# Patient Record
Sex: FEMALE | Race: WHITE | NOT HISPANIC OR LATINO | Employment: STUDENT | URBAN - METROPOLITAN AREA
[De-identification: names, ages, dates, MRNs, and addresses within clinical notes are randomized per-mention and may not be internally consistent; named-entity substitution may affect disease eponyms.]

---
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
## 2024-02-16 ENCOUNTER — PATIENT MESSAGE (OUTPATIENT)
Dept: INTERNAL MEDICINE | Facility: CLINIC | Age: 19
End: 2024-02-16
Payer: OTHER GOVERNMENT

## 2024-06-06 DIAGNOSIS — Z30.09 OTHER GENERAL COUNSELING AND ADVICE FOR CONTRACEPTIVE MANAGEMENT: ICD-10-CM

## 2024-06-06 DIAGNOSIS — N92.6 IRREGULAR MENSTRUAL CYCLE: ICD-10-CM

## 2024-06-06 RX ORDER — ETHYNODIOL DIACETATE AND ETHINYL ESTRADIOL 1 MG-35MCG
1 KIT ORAL DAILY
Qty: 90 TABLET | Refills: 3 | Status: SHIPPED | OUTPATIENT
Start: 2024-06-06 | End: 2025-06-06

## 2024-07-01 ENCOUNTER — OFFICE VISIT (OUTPATIENT)
Dept: URGENT CARE | Facility: CLINIC | Age: 19
End: 2024-07-01
Payer: OTHER GOVERNMENT

## 2024-07-01 VITALS
WEIGHT: 105.19 LBS | HEART RATE: 107 BPM | SYSTOLIC BLOOD PRESSURE: 113 MMHG | BODY MASS INDEX: 19.86 KG/M2 | HEIGHT: 61 IN | RESPIRATION RATE: 16 BRPM | TEMPERATURE: 99 F | DIASTOLIC BLOOD PRESSURE: 79 MMHG | OXYGEN SATURATION: 97 %

## 2024-07-01 DIAGNOSIS — S96.911A STRAIN OF RIGHT ANKLE, INITIAL ENCOUNTER: ICD-10-CM

## 2024-07-01 DIAGNOSIS — L60.0 INGROWN TOENAIL OF LEFT FOOT: ICD-10-CM

## 2024-07-01 DIAGNOSIS — L03.032 CELLULITIS OF GREAT TOE OF LEFT FOOT: Primary | ICD-10-CM

## 2024-07-01 PROCEDURE — 99213 OFFICE O/P EST LOW 20 MIN: CPT | Mod: S$GLB,,, | Performed by: NURSE PRACTITIONER

## 2024-07-01 RX ORDER — NITROFURANTOIN 25; 75 MG/1; MG/1
CAPSULE ORAL
COMMUNITY
Start: 2024-04-27

## 2024-07-01 RX ORDER — MUPIROCIN 20 MG/G
OINTMENT TOPICAL 3 TIMES DAILY
Qty: 22 G | Refills: 0 | Status: SHIPPED | OUTPATIENT
Start: 2024-07-01 | End: 2024-07-06

## 2024-07-01 RX ORDER — DOXYCYCLINE 100 MG/1
100 CAPSULE ORAL 2 TIMES DAILY
Qty: 20 CAPSULE | Refills: 0 | Status: SHIPPED | OUTPATIENT
Start: 2024-07-01 | End: 2024-07-11

## 2024-07-01 RX ORDER — CIPROFLOXACIN 500 MG/1
TABLET ORAL
COMMUNITY
Start: 2024-03-23

## 2024-07-01 NOTE — PROGRESS NOTES
"Subjective:      Patient ID: Kandy Urias is a 19 y.o. female.    Vitals:  height is 5' 1" (1.549 m) and weight is 47.7 kg (105 lb 2.6 oz). Her oral temperature is 98.5 °F (36.9 °C). Her blood pressure is 113/79 and her pulse is 107. Her respiration is 16 and oxygen saturation is 97%.     Chief Complaint: Nail Problem    Pt states she started having symptoms 2-3 weeks ago. Pt states she has a ingrown toe nail on her left great toe. Pt states she did salt water soaks and castor oil on the toe nail and it gave her mild relief.     Nail Problem  This is a new problem. The current episode started 1 to 4 weeks ago. The problem occurs constantly. The problem has been unchanged. Pertinent negatives include no abdominal pain, chest pain, chills, congestion, coughing, diaphoresis, fatigue, fever, headaches, myalgias, nausea, neck pain, rash, sore throat or vomiting. Nothing aggravates the symptoms. Treatments tried: salt water soaks and castor oil. The treatment provided mild relief.       Constitution: Negative for chills, sweating, fatigue and fever.   HENT:  Negative for ear pain, congestion and sore throat.    Neck: Negative for neck pain and neck stiffness.   Cardiovascular:  Negative for chest pain, leg swelling, palpitations and sob on exertion.   Eyes:  Negative for eye pain, eye redness and vision loss.   Respiratory:  Negative for cough, sputum production and shortness of breath.    Gastrointestinal:  Negative for abdominal pain, nausea, vomiting and diarrhea.   Genitourinary:  Negative for dysuria, frequency, urgency, flank pain and hematuria.   Musculoskeletal:  Negative for pain, trauma, abnormal ROM of joint and muscle ache.        Pt rolled ankle at wedding, pt also reports wearing close toed pointed shoes with worsen ingrown toenail symptoms.     Skin:  Negative for color change and rash.   Neurological:  Negative for dizziness, headaches and disorientation.   Psychiatric/Behavioral:  Negative for " disorientation.         Objective:     Physical Exam   Constitutional: She is oriented to person, place, and time. She appears well-developed. awake  HENT:   Head: Normocephalic and atraumatic.   Ears:   Right Ear: Hearing and external ear normal.   Left Ear: Hearing and external ear normal.   Nose: Nose normal.   Mouth/Throat: Uvula is midline, oropharynx is clear and moist and mucous membranes are normal.   Eyes: Conjunctivae, EOM and lids are normal. Pupils are equal, round, and reactive to light.   Neck: Trachea normal and phonation normal. Neck supple. No thyromegaly present.   Cardiovascular: Normal rate, regular rhythm, S1 normal, S2 normal, normal heart sounds and normal pulses.   Pulmonary/Chest: Effort normal and breath sounds normal. No respiratory distress. She has no decreased breath sounds. She has no wheezes. She has no rhonchi.   Abdominal: Bowel sounds are normal. Soft. flat abdomen   Musculoskeletal: Normal range of motion.         General: Normal range of motion.      Right ankle: She exhibits swelling. She exhibits normal range of motion, no ecchymosis, no deformity and no laceration. No tenderness. Achilles tendon normal.      Left ankle: Normal.      Right lower leg: No edema.      Left lower leg: No edema.      Left foot: Left great toe: Exhibits swelling and tenderness.      Comments: Right foot with mild swelling to lateral  ankle full range of motion, +2 pedal and posterior pulse   Lymphadenopathy:     She has no cervical adenopathy.   Neurological: She is alert and oriented to person, place, and time.   Skin: Skin is warm, dry and intact. not right ankle  Psychiatric: Her speech is normal and behavior is normal. Judgment and thought content normal.   Nursing note and vitals reviewed.      Assessment:     1. Cellulitis of great toe of left foot    2. Strain of right ankle, initial encounter    3. Ingrown toenail of left foot        Plan:       Cellulitis of great toe of left foot  -      mupirocin (BACTROBAN) 2 % ointment; Apply topically 3 (three) times daily. Apply to Right great toe for 5 days  Dispense: 22 g; Refill: 0  -     doxycycline (VIBRAMYCIN) 100 MG Cap; Take 1 capsule (100 mg total) by mouth 2 (two) times daily. for 10 days  Dispense: 20 capsule; Refill: 0  -     Ambulatory referral/consult to Podiatry    Strain of right ankle, initial encounter  -     Ambulatory referral/consult to Podiatry    Ingrown toenail of left foot  -     mupirocin (BACTROBAN) 2 % ointment; Apply topically 3 (three) times daily. Apply to Right great toe for 5 days  Dispense: 22 g; Refill: 0  -     doxycycline (VIBRAMYCIN) 100 MG Cap; Take 1 capsule (100 mg total) by mouth 2 (two) times daily. for 10 days  Dispense: 20 capsule; Refill: 0  -     Ambulatory referral/consult to Podiatry      Patient Instructions   Discharge instructions for Cellulitis to left great toe, ingrown toenail and right ankle strain  Referral to Podiatry for further evaluation of ingrown toenail with Cellulitis    Cleanse Left great toe with Hibiclens wash and follow up with normal saline wound wash, pat dry then apply Mupirocin ointment as prescribed 3 times a day for 5 days.  Elevate Left foot to decrease swelling to Left great toe.    Right Ankle Strain   A strain is an injury to muscles or tendons. Immediate treatment of sprains or strains includes RICE - Rest, ice, compression and elevation to the affected joint or limb as needed.    Rest. Allow your injury to heal before you do slow movements.  Place an ice pack or a bag of frozen peas wrapped in a towel over the painful part. Never put ice right on the skin. Do not leave the ice on more than 10 to 15 minutes at a time. Ice after activity may help decrease pain and swelling. Never ice before stretching.  Prop your wrist/arm/knee/pain on pillows to help with swelling.  Use a splint/ brace if the doctor tells you to do this.  Please drink plenty of fluids.  Please get plenty of  rest.      Pain Control  If not allergic, please take over the counter Tylenol (Acetaminophen) 500 mg (take 2 by mouth) every 6 hours and/or Motrin (Ibuprofen) 800 mg every 6 hours as directed for control of pain and/or fever.    If you were prescribed a narcotic medication, do not drive or operate heavy equipment or machinery while taking these medications.    If you were not prescribed an anti-inflammatory medication, and if you do not have any history of stomach/intestinal ulcers, or kidney disease, or are not taking a blood thinner such as Coumadin, Plavix, Pradaxa, Eloquis, or Xaralta for example, it is OK to take over the counter Ibuprofen or Advil or Motrin or Aleve as directed.  Do not take these medications on an empty stomach.    Please remember that you have received care at an urgent care today. Urgent cares are not emergency rooms and are not equipped to handle life threatening emergencies and cannot rule in or out certain medical conditions and you may be released before all of your medical problems are known or treated. Please arrange follow up with your primary care physician or speciality clinic   (orthopedics) within 2-5 days if your signs and symptoms have not resolved or worsen. Patient can call our Referral Hotline at [phone] to make an appointment.    Please return here or go to the Emergency Department for any concerns or worsening of condition.Patient was educated on signs/symptoms that would warrant emergent medical attention. Patient verbalized understanding.  More trouble getting up from a chair, going up and down stairs, or walking  Pain, swelling, warmth, numbness, tingling, or discoloration in the calf below the injured or sore knee  You are not feeling better in 2 or 3 days or you are feeling worse

## 2024-07-01 NOTE — PATIENT INSTRUCTIONS
Discharge instructions for Cellulitis to left great toe, ingrown toenail and right ankle strain  Referral to Podiatry for further evaluation of ingrown toenail with Cellulitis    Cleanse Left great toe with Hibiclens wash and follow up with normal saline wound wash, pat dry then apply Mupirocin ointment as prescribed 3 times a day for 5 days.  Elevate Left foot to decrease swelling to Left great toe.    Right Ankle Strain   A strain is an injury to muscles or tendons. Immediate treatment of sprains or strains includes RICE - Rest, ice, compression and elevation to the affected joint or limb as needed.    Rest. Allow your injury to heal before you do slow movements.  Place an ice pack or a bag of frozen peas wrapped in a towel over the painful part. Never put ice right on the skin. Do not leave the ice on more than 10 to 15 minutes at a time. Ice after activity may help decrease pain and swelling. Never ice before stretching.  Prop your wrist/arm/knee/pain on pillows to help with swelling.  Use a splint/ brace if the doctor tells you to do this.  Please drink plenty of fluids.  Please get plenty of rest.      Pain Control  If not allergic, please take over the counter Tylenol (Acetaminophen) 500 mg (take 2 by mouth) every 6 hours and/or Motrin (Ibuprofen) 800 mg every 6 hours as directed for control of pain and/or fever.    If you were prescribed a narcotic medication, do not drive or operate heavy equipment or machinery while taking these medications.    If you were not prescribed an anti-inflammatory medication, and if you do not have any history of stomach/intestinal ulcers, or kidney disease, or are not taking a blood thinner such as Coumadin, Plavix, Pradaxa, Eloquis, or Xaralta for example, it is OK to take over the counter Ibuprofen or Advil or Motrin or Aleve as directed.  Do not take these medications on an empty stomach.    Please remember that you have received care at an urgent care today. Urgent cares  are not emergency rooms and are not equipped to handle life threatening emergencies and cannot rule in or out certain medical conditions and you may be released before all of your medical problems are known or treated. Please arrange follow up with your primary care physician or speciality clinic   (orthopedics) within 2-5 days if your signs and symptoms have not resolved or worsen. Patient can call our Referral Hotline at [phone] to make an appointment.    Please return here or go to the Emergency Department for any concerns or worsening of condition.Patient was educated on signs/symptoms that would warrant emergent medical attention. Patient verbalized understanding.  More trouble getting up from a chair, going up and down stairs, or walking  Pain, swelling, warmth, numbness, tingling, or discoloration in the calf below the injured or sore knee  You are not feeling better in 2 or 3 days or you are feeling worse

## 2024-11-19 DIAGNOSIS — Z30.09 OTHER GENERAL COUNSELING AND ADVICE FOR CONTRACEPTIVE MANAGEMENT: ICD-10-CM

## 2024-11-19 DIAGNOSIS — N92.6 IRREGULAR MENSTRUAL CYCLE: ICD-10-CM

## 2024-11-19 RX ORDER — ETHYNODIOL DIACETATE AND ETHINYL ESTRADIOL 1 MG-35MCG
1 KIT ORAL DAILY
Qty: 30 TABLET | Refills: 0 | Status: SHIPPED | OUTPATIENT
Start: 2024-11-19 | End: 2025-11-19

## 2024-11-20 NOTE — TELEPHONE ENCOUNTER
Contacted pt to inform her that Dr. Dickersno refilled her Rx for OCP once but she needs to schedule an appt. Pt states she prefers to be seen in [place] where she lives. Pt given phone number to [facility] in [place] to schedule appt. Pt informed that Dr. Dickerson won't refill until she see her in the office. Pt verbalized understanding.

## 2024-12-31 ENCOUNTER — OFFICE VISIT (OUTPATIENT)
Dept: INTERNAL MEDICINE | Facility: CLINIC | Age: 19
End: 2024-12-31
Payer: OTHER GOVERNMENT

## 2024-12-31 VITALS
HEIGHT: 61 IN | DIASTOLIC BLOOD PRESSURE: 74 MMHG | SYSTOLIC BLOOD PRESSURE: 108 MMHG | BODY MASS INDEX: 20.27 KG/M2 | WEIGHT: 107.38 LBS | OXYGEN SATURATION: 99 % | HEART RATE: 83 BPM

## 2024-12-31 DIAGNOSIS — Z00.00 WELLNESS EXAMINATION: Primary | ICD-10-CM

## 2024-12-31 PROCEDURE — 99213 OFFICE O/P EST LOW 20 MIN: CPT | Mod: PBBFAC

## 2024-12-31 PROCEDURE — 99395 PREV VISIT EST AGE 18-39: CPT | Mod: S$PBB,,,

## 2024-12-31 PROCEDURE — 99999 PR PBB SHADOW E&M-EST. PATIENT-LVL III: CPT | Mod: PBBFAC,,,

## 2024-12-31 NOTE — PROGRESS NOTES
Internal Medicine Annual Exam       CHIEF COMPLAINT     The patient, Kandy Urias, who is a 19 y.o. female presents for an annual exam.    HPI   This note was generated with the assistance of ambient listening technology. Verbal consent was obtained by the patient and accompanying visitor(s) for the recording of patient appointment to facilitate this note. I attest to having reviewed and edited the generated note for accuracy, though some syntax or spelling errors may persist. Please contact the author of this note for any clarification.    History of Present Illness    CHIEF COMPLAINT:  Patient presents today for annual visit.    INTEGUMENTARY:  She reports a skin condition on her legs following concrete hot tub exposure on [place] day, initially presenting with dryness, cracking, darker appearance, and itching. Symptoms have improved with self-treatment using aloe vera and regular lotion application, though residual dryness and cracking persist. The itching has resolved.    REVIEW OF SYSTEMS:  She denies any bowel or bladder issues, numbness, tingling, or swelling in feet.    MEDICATIONS:  She is currently only taking birth control.      ROS:  General: -fever, -chills, -fatigue, -weight gain, -weight loss  Eyes: -vision changes, -redness, -discharge  ENT: -ear pain, -nasal congestion, -sore throat  Cardiovascular: -chest pain, -palpitations, -lower extremity edema  Respiratory: -cough, -shortness of breath  Gastrointestinal: -abdominal pain, -nausea, -vomiting, -diarrhea, -constipation, -blood in stool  Genitourinary: -dysuria, -hematuria, -frequency  Musculoskeletal: -joint pain, -muscle pain  Skin: -rash, -lesion, +dry skin  Neurological: -headache, -dizziness, -numbness, -tingling  Psychiatric: -anxiety, -depression, -sleep difficulty           Past Medical History:  Past Medical History:   Diagnosis Date    Allergy        Past Surgical History:   Procedure Laterality Date    FACIAL RECONSTRUCTION SURGERY           Family History   Problem Relation Name Age of Onset    Asthma Mother      Hyperlipidemia Father 52     Depression Brother Marcello     Depression Brother Rogelio     Asthma Brother Augusto     No Known Problems Maternal Grandmother      No Known Problems Maternal Grandfather      Congenital heart disease Paternal Grandmother      Lung cancer Paternal Grandmother      Congenital heart disease Paternal Grandfather      Heart disease Paternal Grandfather      Hyperlipidemia Paternal Grandfather      Hyperlipidemia Paternal Uncle      Early death Neg Hx      Eczema Neg Hx      ADD / ADHD Neg Hx      Hypertension Neg Hx      Thyroid disease Neg Hx      Blindness Neg Hx      Glaucoma Neg Hx      Retinal detachment Neg Hx      Macular degeneration Neg Hx          Social History     Socioeconomic History    Marital status: Single   Tobacco Use    Smoking status: Never    Smokeless tobacco: Never   Substance and Sexual Activity    Alcohol use: No    Drug use: No    Sexual activity: Yes   Social History Narrative    Lives with mother, father, brothers marcello and Memo. Father is retired [employer]    Mother is a part time speech pathologist at [employer]      Social Drivers of Health     Financial Resource Strain: Low Risk  (12/31/2024)    Overall Financial Resource Strain (CARDIA)     Difficulty of Paying Living Expenses: Not very hard   Food Insecurity: No Food Insecurity (12/31/2024)    Hunger Vital Sign     Worried About Running Out of Food in the Last Year: Never true     Ran Out of Food in the Last Year: Never true   Physical Activity: Sufficiently Active (12/31/2024)    Exercise Vital Sign     Days of Exercise per Week: 4 days     Minutes of Exercise per Session: 60 min   Stress: No Stress Concern Present (12/31/2024)    [employer] - Occupational Stress Questionnaire     Feeling of Stress : Only a little   Housing Stability: Unknown (12/31/2024)    Housing Stability Vital  Sign     Unable to Pay for Housing in the Last Year: No        Social History     Tobacco Use   Smoking Status Never   Smokeless Tobacco Never        Allergies as of 12/31/2024 - Reviewed 12/31/2024   Allergen Reaction Noted    Amoxicillin Hives 09/30/2014    Augmentin [amoxicillin-pot clavulanate] Hives 01/06/2015    Pcn [penicillins] Hives 09/30/2014    Bactrim [sulfamethoxazole-trimethoprim] Rash 11/10/2014          Home Medications:  Prior to Admission medications    Medication Sig Start Date End Date Taking? Authorizing Provider   ethynodiol-ethinyl estradiol (KELNOR) 1-35 mg-mcg per tablet Take 1 tablet by mouth once daily. 11/19/24 11/19/25 Yes Weeks, Mariya RIVERA MD   ciprofloxacin HCl (CIPRO) 500 MG tablet  3/23/24 12/31/24  Provider, Historical   doxycycline monohydrate 100 mg Tab Take 1 tablet (100 mg total) by mouth 2 (two) times daily.  Patient not taking: Reported on 7/1/2024 8/3/23 12/31/24  Jay Molina DPM   nitrofurantoin, macrocrystal-monohydrate, (MACROBID) 100 MG capsule  4/27/24 12/31/24  Provider, Historical   tobramycin sulfate 0.3% (TOBREX) 0.3 % ophthalmic solution 1-2 drops topically twice daily to affected toe(s).  Patient not taking: Reported on 7/1/2024 8/3/23 12/31/24  Jay Molina DPM   triamcinolone acetonide 0.025% (KENALOG) 0.025 % cream Apply topically 2 (two) times daily.  Patient not taking: Reported on 7/1/2024 7/14/23 12/31/24  Emerald Dunlap NP       Review of Systems:  Review of Systems   Constitutional: Negative.  Negative for chills, fever and unexpected weight change.   HENT:  Negative for congestion and sore throat.    Eyes:  Negative for visual disturbance.   Respiratory: Negative.  Negative for cough, shortness of breath and wheezing.    Cardiovascular:  Negative for chest pain and palpitations.   Gastrointestinal:  Negative for abdominal pain, constipation and diarrhea.   Endocrine: Negative for polydipsia and polyuria.   Genitourinary:  Negative for  "difficulty urinating and menstrual problem.   Musculoskeletal: Negative.    Skin: Negative.    Neurological:  Negative for facial asymmetry, speech difficulty and weakness.   Hematological:  Does not bruise/bleed easily.   Psychiatric/Behavioral:  Negative for sleep disturbance and suicidal ideas. The patient is not nervous/anxious.        Health Maintainence:   Immunizations:  Health Maintenance         Date Due Completion Date    Chlamydia Screening 07/14/2024 7/14/2023    Influenza Vaccine (1) 09/01/2024 10/1/2019    COVID-19 Vaccine (3 - 2024-25 season) 09/01/2024 4/1/2022    TETANUS VACCINE 07/29/2025 7/29/2015    DTaP/Tdap/Td Vaccines (7 - Td or Tdap) 07/29/2025 7/29/2015    RSV Vaccine (Age 60+ and Pregnant patients) (1 - 1-dose 75+ series) 01/31/2079 ---             PHYSICAL EXAM     /74   Pulse 83   Ht 5' 1" (1.549 m)   Wt 48.7 kg (107 lb 5.8 oz)   SpO2 99%   BMI 20.29 kg/m²  Body mass index is 20.29 kg/m².    Physical Exam  Vitals reviewed.   Constitutional:       General: She is not in acute distress.     Appearance: She is well-developed. She is not diaphoretic.   HENT:      Head: Normocephalic and atraumatic.      Nose: Nose normal.   Eyes:      General:         Right eye: No discharge.         Left eye: No discharge.      Conjunctiva/sclera: Conjunctivae normal.      Pupils: Pupils are equal, round, and reactive to light.   Neck:      Thyroid: No thyromegaly.   Cardiovascular:      Rate and Rhythm: Normal rate and regular rhythm.      Heart sounds: No murmur heard.  Pulmonary:      Effort: Pulmonary effort is normal. No respiratory distress.      Breath sounds: Normal breath sounds.   Abdominal:      General: Abdomen is flat. There is no distension.      Palpations: Abdomen is soft.   Musculoskeletal:      Cervical back: Neck supple.   Skin:     General: Skin is warm and dry.   Neurological:      Mental Status: She is alert and oriented to person, place, and time.   Psychiatric:         " Behavior: Behavior normal.           ASSESSMENT/PLAN     Assessment & Plan    IMPRESSION:  - Annual wellness visit for 19-year-old patient, turning 20 next month  - Physical exam unremarkable; vitals within normal limits  - Assessed skin irritation on legs from hot tub exposure; improving with current self-care measures    SKIN DISORDER:  - Patient to continue applying aloe vera and regular lotion to healing skin irritation on legs.    CONTRACEPTIVE MANAGEMENT:  - Continued birth control (current regimen).    GENERAL ADULT MEDICAL EXAMINATION:  - CBC, CMP, and lipid panel ordered as routine labs.  - Follow up in 1 week for fasting bloodwork.  - Results will be available through patient portal.  - Contact the office if any results require discussion.    GYNECOLOGICAL EXAMINATION:  - Referred to gynecologist for well-woman visit and first pap smear.         Kandy was seen today for annual exam.    Diagnoses and all orders for this visit:    Wellness examination  -     CBC Auto Differential; Future  -     Comprehensive Metabolic Panel; Future  -     Lipid Panel; Future  -     Ambulatory referral/consult to Gynecology; Future           Rik Montgomery NP   Department of Internal Medicine - [facility]  9:55 AM

## 2025-02-11 ENCOUNTER — OFFICE VISIT (OUTPATIENT)
Dept: OBSTETRICS AND GYNECOLOGY | Facility: CLINIC | Age: 20
End: 2025-02-11
Payer: OTHER GOVERNMENT

## 2025-02-11 VITALS
WEIGHT: 107.81 LBS | HEIGHT: 61 IN | SYSTOLIC BLOOD PRESSURE: 104 MMHG | DIASTOLIC BLOOD PRESSURE: 62 MMHG | BODY MASS INDEX: 20.35 KG/M2

## 2025-02-11 DIAGNOSIS — Z00.00 WELLNESS EXAMINATION: ICD-10-CM

## 2025-02-11 DIAGNOSIS — Z12.4 CERVICAL CANCER SCREENING: Primary | ICD-10-CM

## 2025-02-11 DIAGNOSIS — Z30.09 BIRTH CONTROL COUNSELING: ICD-10-CM

## 2025-02-11 DIAGNOSIS — Z30.41 ORAL CONTRACEPTIVE PILL SURVEILLANCE: ICD-10-CM

## 2025-02-11 DIAGNOSIS — Z11.3 ROUTINE SCREENING FOR STI (SEXUALLY TRANSMITTED INFECTION): ICD-10-CM

## 2025-02-11 PROCEDURE — 99999 PR PBB SHADOW E&M-EST. PATIENT-LVL III: CPT | Mod: PBBFAC,,, | Performed by: STUDENT IN AN ORGANIZED HEALTH CARE EDUCATION/TRAINING PROGRAM

## 2025-02-11 PROCEDURE — 99213 OFFICE O/P EST LOW 20 MIN: CPT | Mod: PBBFAC | Performed by: STUDENT IN AN ORGANIZED HEALTH CARE EDUCATION/TRAINING PROGRAM

## 2025-02-11 PROCEDURE — 87491 CHLMYD TRACH DNA AMP PROBE: CPT | Performed by: STUDENT IN AN ORGANIZED HEALTH CARE EDUCATION/TRAINING PROGRAM

## 2025-02-11 PROCEDURE — 99395 PREV VISIT EST AGE 18-39: CPT | Mod: S$PBB,,, | Performed by: STUDENT IN AN ORGANIZED HEALTH CARE EDUCATION/TRAINING PROGRAM

## 2025-02-11 RX ORDER — ETHYNODIOL DIACETATE AND ETHINYL ESTRADIOL 1 MG-35MCG
1 KIT ORAL DAILY
Qty: 84 TABLET | Refills: 4 | Status: SHIPPED | OUTPATIENT
Start: 2025-02-11 | End: 2026-02-11

## 2025-02-11 NOTE — PROGRESS NOTES
"HPI: Pt is a 20 y.o.  female who presents for routine annual exam.   Wants to review contraceptive options. Considering patch vs nexplanon vs continuing her pill.     Contraception: COCs   - started COCs for menstrual regulation   - thinks also had skin benefit (back and body acne)  STI screening: agrees to gc/ct    Cervical cancer screening: due   Most recent: n/a   History abnormal: n/a   Gardasil: 3/3 complete  Breast cancer screening: n/a  Colon cancer screening: n/a    Family history breast cancer: no  Family history ovarian cancer: no  Family history colon cancer: no      OB History    Para Term  AB Living   0 0 0 0 0 0   SAB IAB Ectopic Multiple Live Births   0 0 0 0 0        /62   Ht 5' 1" (1.549 m)   Wt 48.9 kg (107 lb 12.9 oz)   LMP 2025 (Exact Date)   BMI 20.37 kg/m²        PE:   APPEARANCE: Well nourished, well developed, White female in no acute distress.  NODES: no inguinal lymphadenopathy  BREASTS: Symmetrical, no skin changes or visible lesions. No palpable masses, nipple discharge or adenopathy bilaterally.  ABDOMEN: Soft. No tenderness or masses. No distention.   VULVA: No lesions. Normal external female genitalia.  URETHRAL MEATUS: Normal size and location, no lesions, no prolapse.  URETHRA: No masses, tenderness, or prolapse.  VAGINA: Moist. No lesions or lacerations noted. No abnormal discharge present. No odor present.   CERVIX: No lesions or discharge. No cervical motion tenderness.   UTERUS: Normal size, regular shape, mobile, non-tender.  ADNEXA: No tenderness. No fullness or masses palpated in the adnexal regions.   ANUS PERINEUM: Normal.      Diagnosis:  1. Cervical cancer screening    2. Wellness examination    3. Oral contraceptive pill surveillance    4. Birth control counseling    5. Routine screening for STI (sexually transmitted infection)        Plan:     Orders Placed This Encounter    C. trachomatis/N. gonorrhoeae by [facility]; Cervicovaginal    " Liquid-Based Pap Smear, Screening    ethynodiol-ethinyl estradiol (KELNOR) 1-35 mg-mcg per tablet     Full discussion regarding all contraceptive options including risks and benefits of each, emphasizing LARCs with the highest efficacy and patient satisfaction, and lowest discontinuation rates. Patient currently undecided, educational materials provided.  Condoms recommended for STI protection.     Patient was counseled today on the current  ACS guidelines for cervical cytology screening as well as the current recommendations for breast cancer screening.   She was counseled to follow up with her PCP for other routine health maintenance.        RTC 1 year or sooner SAMANTA Gaitan is welcome to call or send [facility] message with contraception decision    Fernanda Chaves MD  Obstetrics & Gynecology   [facility]

## 2025-02-12 LAB
C TRACH DNA SPEC QL NAA+PROBE: NOT DETECTED
N GONORRHOEA DNA SPEC QL NAA+PROBE: NOT DETECTED

## 2025-02-18 ENCOUNTER — RESULTS FOLLOW-UP (OUTPATIENT)
Dept: OBSTETRICS AND GYNECOLOGY | Facility: CLINIC | Age: 20
End: 2025-02-18

## 2025-06-09 ENCOUNTER — OFFICE VISIT (OUTPATIENT)
Dept: URGENT CARE | Facility: CLINIC | Age: 20
End: 2025-06-09
Payer: OTHER GOVERNMENT

## 2025-06-09 VITALS
SYSTOLIC BLOOD PRESSURE: 109 MMHG | BODY MASS INDEX: 19.16 KG/M2 | DIASTOLIC BLOOD PRESSURE: 76 MMHG | HEART RATE: 84 BPM | OXYGEN SATURATION: 97 % | RESPIRATION RATE: 20 BRPM | TEMPERATURE: 99 F | HEIGHT: 61 IN | WEIGHT: 101.5 LBS

## 2025-06-09 DIAGNOSIS — N30.01 ACUTE CYSTITIS WITH HEMATURIA: Primary | ICD-10-CM

## 2025-06-09 DIAGNOSIS — R30.0 DYSURIA: ICD-10-CM

## 2025-06-09 LAB
B-HCG UR QL: NEGATIVE
BILIRUBIN, UA POC OHS: ABNORMAL
BLOOD, UA POC OHS: ABNORMAL
CLARITY, UA POC OHS: ABNORMAL
COLOR, UA POC OHS: YELLOW
CTP QC/QA: YES
GLUCOSE, UA POC OHS: NEGATIVE
KETONES, UA POC OHS: NEGATIVE
LEUKOCYTES, UA POC OHS: ABNORMAL
NITRITE, UA POC OHS: NEGATIVE
PH, UA POC OHS: 6
PROTEIN, UA POC OHS: 100
SPECIFIC GRAVITY, UA POC OHS: >=1.03
UROBILINOGEN, UA POC OHS: 0.2

## 2025-06-09 PROCEDURE — 81003 URINALYSIS AUTO W/O SCOPE: CPT | Mod: QW,S$GLB,, | Performed by: NURSE PRACTITIONER

## 2025-06-09 PROCEDURE — 99214 OFFICE O/P EST MOD 30 MIN: CPT | Mod: S$GLB,,, | Performed by: NURSE PRACTITIONER

## 2025-06-09 PROCEDURE — 81025 URINE PREGNANCY TEST: CPT | Mod: S$GLB,,, | Performed by: NURSE PRACTITIONER

## 2025-06-09 RX ORDER — NITROFURANTOIN 25; 75 MG/1; MG/1
100 CAPSULE ORAL 2 TIMES DAILY
Qty: 10 CAPSULE | Refills: 0 | Status: SHIPPED | OUTPATIENT
Start: 2025-06-09 | End: 2025-06-14

## 2025-06-09 NOTE — PATIENT INSTRUCTIONS
- You must understand that you have received an Urgent Care treatment only and that you may be released before all of your medical problems are known or treated.   - You, the patient, will arrange for follow up care as instructed.   - If your condition worsens or fails to improve we recommend that you receive another evaluation at the ER immediately or contact your PCP to discuss your concerns.   - You can call [phone] or [phone] to help schedule an appointment with the appropriate provider.    Drink plenty of water  Get lots of rest

## 2025-06-09 NOTE — LETTER
June 9, 2025      [facility] [address] [address]  Phone: [phone]  Fax: [phone]       Patient: Kandy Urias   YOB: 2005  Date of Visit: 06/09/2025    To Whom It May Concern:    Ju Urias  was at [facility] on 06/09/2025. The patient may return to work/school on 06/10/2025 with no restrictions. If you have any questions or concerns, or if I can be of further assistance, please do not hesitate to contact me.    Sincerely,    Riana Simpson NP

## 2025-06-09 NOTE — PROGRESS NOTES
"Subjective:      Patient ID: Kandy Urias is a 20 y.o. female.    Vitals:  height is 5' 1" (1.549 m) and weight is 46.1 kg (101 lb 8.4 oz). Her oral temperature is 99 °F (37.2 °C). Her blood pressure is 109/76 and her pulse is 84. Her respiration is 20 and oxygen saturation is 97%.     Chief Complaint: Dysuria    Patient is a 20 y.o. female who presents today with a chief complaint of dysuria that started this morning. Associated symptoms: frequency, hematuria, pelvic, burning sensation while urinating, slight hesitancy, slight discharge and urgency.  Pt did not take anything.       Dysuria   This is a new problem. The current episode started today. The problem has been unchanged. The quality of the pain is described as burning. The pain is at a severity of 6/10. The pain is moderate. There has been no fever. She is Sexually active. Associated symptoms include a discharge, frequency, hematuria, hesitancy and urgency. Pertinent negatives include no flank pain, nausea, possible pregnancy or vomiting. She has tried nothing for the symptoms.       Gastrointestinal:  Negative for nausea and vomiting.   Genitourinary:  Positive for dysuria, frequency, urgency and hematuria. Negative for flank pain.      Objective:     Physical Exam   Constitutional: She is oriented to person, place, and time.   HENT:   Head: Normocephalic.   Ears:   Right Ear: External ear normal.   Left Ear: External ear normal.   Nose: Nose normal.   Mouth/Throat: Mucous membranes are moist.   Eyes: Conjunctivae are normal.   Cardiovascular: Normal rate.   Pulmonary/Chest: Effort normal.   Abdominal: Bowel sounds are normal. Soft. flat abdomen There is no abdominal tenderness. There is no guarding, no left CVA tenderness and no right CVA tenderness.   Musculoskeletal: Normal range of motion.         General: Normal range of motion.   Neurological: She is alert and oriented to person, place, and time.   Skin: Skin is dry.   Psychiatric: Her behavior " is normal.   Nursing note and vitals reviewed.    Results for orders placed or performed in visit on 06/09/25   POCT urine pregnancy    Collection Time: 06/09/25  3:22 PM   Result Value Ref Range    POC Preg Test, Ur Negative Negative     Acceptable Yes    POCT Urinalysis(Instrument)    Collection Time: 06/09/25  3:22 PM   Result Value Ref Range    Color, POC UA Yellow Yellow, Straw, Colorless    Clarity, POC UA Cloudy (A) Clear    Glucose, POC UA Negative Negative    Bilirubin, POC UA Small (A) Negative    Ketones, POC UA Negative Negative    Spec Grav POC UA >=1.030 1.005 - 1.030    Blood, POC UA Large (A) Negative    pH, POC UA 6.0 5.0 - 8.0    Protein, POC  (A) Negative    Urobilinogen, POC UA 0.2 <=1.0    Nitrite, POC UA Negative Negative    WBC, POC UA Small (A) Negative     Assessment:     1. Acute cystitis with hematuria    2. Dysuria        Plan:       Acute cystitis with hematuria  -     nitrofurantoin, macrocrystal-monohydrate, (MACROBID) 100 MG capsule; Take 1 capsule (100 mg total) by mouth 2 (two) times daily. for 5 days  Dispense: 10 capsule; Refill: 0    Dysuria  -     POCT urine pregnancy  -     POCT Urinalysis(Instrument)      Patient Instructions   - You must understand that you have received an Urgent Care treatment only and that you may be released before all of your medical problems are known or treated.   - You, the patient, will arrange for follow up care as instructed.   - If your condition worsens or fails to improve we recommend that you receive another evaluation at the ER immediately or contact your PCP to discuss your concerns.   - You can call [phone] or [phone] to help schedule an appointment with the appropriate provider.    Drink plenty of water  Get lots of rest

## 2025-08-06 DIAGNOSIS — Z30.41 ORAL CONTRACEPTIVE PILL SURVEILLANCE: ICD-10-CM

## 2025-08-06 RX ORDER — ETHYNODIOL DIACETATE AND ETHINYL ESTRADIOL 1 MG-35MCG
1 KIT ORAL DAILY
Qty: 84 TABLET | Refills: 4 | Status: SHIPPED | OUTPATIENT
Start: 2025-08-06 | End: 2026-08-06